# Patient Record
Sex: FEMALE | NOT HISPANIC OR LATINO | URBAN - METROPOLITAN AREA
[De-identification: names, ages, dates, MRNs, and addresses within clinical notes are randomized per-mention and may not be internally consistent; named-entity substitution may affect disease eponyms.]

---

## 2019-10-02 ENCOUNTER — APPOINTMENT (RX ONLY)
Dept: URBAN - METROPOLITAN AREA CLINIC 369 | Facility: CLINIC | Age: 24
Setting detail: DERMATOLOGY
End: 2019-10-02

## 2019-10-02 DIAGNOSIS — L70.0 ACNE VULGARIS: ICD-10-CM

## 2019-10-02 PROBLEM — J45.909 UNSPECIFIED ASTHMA, UNCOMPLICATED: Status: ACTIVE | Noted: 2019-10-02

## 2019-10-02 PROCEDURE — 99202 OFFICE O/P NEW SF 15 MIN: CPT

## 2019-10-02 PROCEDURE — ? TREATMENT REGIMEN

## 2019-10-02 PROCEDURE — ? COUNSELING

## 2019-10-02 PROCEDURE — ? PRESCRIPTION

## 2019-10-02 RX ORDER — DOXYCYCLINE HYCLATE 100 MG/1
CAPSULE, GELATIN COATED ORAL
Qty: 30 | Refills: 1 | Status: ERX | COMMUNITY
Start: 2019-10-02

## 2019-10-02 RX ORDER — TAZAROTENE 0.5 MG/G
CREAM CUTANEOUS
Qty: 1 | Refills: 1 | Status: ERX | COMMUNITY
Start: 2019-10-02

## 2019-10-02 RX ORDER — ADAPALENE AND BENZOYL PEROXIDE 3; 25 MG/G; MG/G
GEL TOPICAL
Qty: 1 | Refills: 3 | Status: ERX | COMMUNITY
Start: 2019-10-02

## 2019-10-02 RX ADMIN — TAZAROTENE: 0.5 CREAM CUTANEOUS at 19:17

## 2019-10-02 RX ADMIN — DOXYCYCLINE HYCLATE: 100 CAPSULE, GELATIN COATED ORAL at 19:17

## 2019-10-02 RX ADMIN — ADAPALENE AND BENZOYL PEROXIDE: 3; 25 GEL TOPICAL at 19:15

## 2019-10-02 ASSESSMENT — LOCATION SIMPLE DESCRIPTION DERM
LOCATION SIMPLE: RIGHT CHEEK
LOCATION SIMPLE: LEFT CHEEK

## 2019-10-02 ASSESSMENT — LOCATION DETAILED DESCRIPTION DERM
LOCATION DETAILED: LEFT INFERIOR CENTRAL MALAR CHEEK
LOCATION DETAILED: RIGHT CENTRAL MALAR CHEEK

## 2019-10-02 ASSESSMENT — LOCATION ZONE DERM: LOCATION ZONE: FACE

## 2019-10-02 NOTE — PROCEDURE: TREATMENT REGIMEN
Detail Level: Zone
Initiate Treatment: AM: \\n1. Wash face daily with Cetaphil Gentle Cleanser \\n2. Pat skin dry \\n3. Apply a good moisturizer with SPF daily such as CeraVe AM \\n\\nOrally: take Doxycycline once daily with food and water\\n\\nPM: \\n1. Wash face with Cetaphil Gentle Cleanser \\n2. Pat skin dry \\n3. Apply a thin layer of Epiduo Forte pea sized amount to the face, rub in well.Apply every other night for 2-3 weeks then work up to nightly as tolerated. \\n4. Apply a moisturizer such as CeraVe PM or Cetaphil.\\n\\nBack: \\n1. Wash chest and back in the shower with Benzoyl Peroxide over the counter regimen, have it sit 3-5 min\\n2. Pat skin dry\\n3. Apply a thin layer of Tazorac to the back daily.

## 2019-11-14 ENCOUNTER — RX ONLY (OUTPATIENT)
Age: 24
Setting detail: RX ONLY
End: 2019-11-14

## 2019-11-14 ENCOUNTER — APPOINTMENT (RX ONLY)
Dept: URBAN - METROPOLITAN AREA CLINIC 369 | Facility: CLINIC | Age: 24
Setting detail: DERMATOLOGY
End: 2019-11-14

## 2019-11-14 DIAGNOSIS — L70.0 ACNE VULGARIS: ICD-10-CM

## 2019-11-14 PROCEDURE — ? COUNSELING

## 2019-11-14 PROCEDURE — 99213 OFFICE O/P EST LOW 20 MIN: CPT

## 2019-11-14 PROCEDURE — ? TREATMENT REGIMEN

## 2019-11-14 RX ORDER — TAZAROTENE 0.5 MG/G
CREAM CUTANEOUS
Qty: 1 | Refills: 1 | Status: ERX

## 2019-11-14 RX ORDER — ADAPALENE AND BENZOYL PEROXIDE 3; 25 MG/G; MG/G
GEL TOPICAL
Qty: 1 | Refills: 3 | Status: ERX

## 2019-11-14 ASSESSMENT — LOCATION ZONE DERM: LOCATION ZONE: FACE

## 2019-11-14 ASSESSMENT — LOCATION DETAILED DESCRIPTION DERM
LOCATION DETAILED: LEFT INFERIOR CENTRAL MALAR CHEEK
LOCATION DETAILED: RIGHT CENTRAL MALAR CHEEK

## 2019-11-14 ASSESSMENT — LOCATION SIMPLE DESCRIPTION DERM
LOCATION SIMPLE: RIGHT CHEEK
LOCATION SIMPLE: LEFT CHEEK

## 2019-11-14 NOTE — PROCEDURE: TREATMENT REGIMEN
Plan: Patient states she was unable to get Tazorac and Epiduo from her local pharmacy. [facility]  and start using.\\n\\nFeels that face has gotten better with doxy but back has gotten worse. Discussed that can sometimes have a flare of acne before things get better. Would recommend continuing doxy for an additional 4 weeks plus starting topical regimen. Reveal in 6 weeks. If not much improved, will start Accutane.
Initiate Treatment: AM: \\n1. Wash face daily with Cetaphil Gentle Cleanser \\n2. Pat skin dry \\n3. Apply a good moisturizer with SPF daily such as CeraVe AM \\n\\nOrally: take Doxycycline once daily with food and water for an additional 4 weeks \\n\\nPM: \\n1. Wash face with Cetaphil Gentle Cleanser \\n2. Pat skin dry \\n3. Apply a thin layer of Epiduo Forte pea sized amount to the face, rub in well.Apply every other night for 2-3 weeks then work up to nightly as tolerated. \\n4. Apply a moisturizer such as CeraVe PM or Cetaphil.\\n\\nBack: \\n1. Wash chest and back in the shower with Benzoyl Peroxide over the counter regimen, have it sit 3-5 min\\n2. Pat skin dry\\n3. Apply a thin layer of Tazorac to the back daily.
Detail Level: Zone